# Patient Record
Sex: MALE | Race: BLACK OR AFRICAN AMERICAN | NOT HISPANIC OR LATINO | Employment: UNEMPLOYED | URBAN - METROPOLITAN AREA
[De-identification: names, ages, dates, MRNs, and addresses within clinical notes are randomized per-mention and may not be internally consistent; named-entity substitution may affect disease eponyms.]

---

## 2024-05-01 ENCOUNTER — HOSPITAL ENCOUNTER (EMERGENCY)
Facility: CLINIC | Age: 24
Discharge: HOME | End: 2024-05-01
Attending: PHYSICIAN ASSISTANT | Admitting: PHYSICIAN ASSISTANT
Payer: MEDICAID

## 2024-05-01 VITALS
HEART RATE: 72 BPM | TEMPERATURE: 98.1 F | DIASTOLIC BLOOD PRESSURE: 97 MMHG | WEIGHT: 170 LBS | SYSTOLIC BLOOD PRESSURE: 142 MMHG | OXYGEN SATURATION: 100 % | RESPIRATION RATE: 18 BRPM

## 2024-05-01 DIAGNOSIS — J10.1 INFLUENZA A: ICD-10-CM

## 2024-05-01 LAB
ALBUMIN SERPL BCG-MCNC: 4.4 G/DL (ref 3.5–5.2)
ALP SERPL-CCNC: 63 U/L (ref 40–150)
ALT SERPL W P-5'-P-CCNC: 21 U/L (ref 0–70)
ANION GAP SERPL CALCULATED.3IONS-SCNC: 8 MMOL/L (ref 7–15)
AST SERPL W P-5'-P-CCNC: 19 U/L (ref 0–45)
BASOPHILS # BLD AUTO: 0 10E3/UL (ref 0–0.2)
BASOPHILS NFR BLD AUTO: 0 %
BILIRUB SERPL-MCNC: 0.4 MG/DL
BUN SERPL-MCNC: 16 MG/DL (ref 6–20)
CALCIUM SERPL-MCNC: 9.1 MG/DL (ref 8.6–10)
CHLORIDE SERPL-SCNC: 104 MMOL/L (ref 98–107)
CREAT SERPL-MCNC: 0.98 MG/DL (ref 0.67–1.17)
DEPRECATED HCO3 PLAS-SCNC: 29 MMOL/L (ref 22–29)
EGFRCR SERPLBLD CKD-EPI 2021: >90 ML/MIN/1.73M2
EOSINOPHIL # BLD AUTO: 0 10E3/UL (ref 0–0.7)
EOSINOPHIL NFR BLD AUTO: 1 %
ERYTHROCYTE [DISTWIDTH] IN BLOOD BY AUTOMATED COUNT: 12.2 % (ref 10–15)
FLUAV RNA SPEC QL NAA+PROBE: POSITIVE
FLUBV RNA RESP QL NAA+PROBE: NEGATIVE
GLUCOSE SERPL-MCNC: 110 MG/DL (ref 70–99)
HCT VFR BLD AUTO: 49.2 % (ref 40–53)
HGB BLD-MCNC: 16.5 G/DL (ref 13.3–17.7)
IMM GRANULOCYTES # BLD: 0 10E3/UL
IMM GRANULOCYTES NFR BLD: 0 %
LIPASE SERPL-CCNC: 30 U/L (ref 13–60)
LYMPHOCYTES # BLD AUTO: 1.9 10E3/UL (ref 0.8–5.3)
LYMPHOCYTES NFR BLD AUTO: 55 %
MCH RBC QN AUTO: 29.3 PG (ref 26.5–33)
MCHC RBC AUTO-ENTMCNC: 33.5 G/DL (ref 31.5–36.5)
MCV RBC AUTO: 87 FL (ref 78–100)
MONOCYTES # BLD AUTO: 0.5 10E3/UL (ref 0–1.3)
MONOCYTES NFR BLD AUTO: 14 %
NEUTROPHILS # BLD AUTO: 1.1 10E3/UL (ref 1.6–8.3)
NEUTROPHILS NFR BLD AUTO: 30 %
NRBC # BLD AUTO: 0 10E3/UL
NRBC BLD AUTO-RTO: 0 /100
PLATELET # BLD AUTO: 148 10E3/UL (ref 150–450)
POTASSIUM SERPL-SCNC: 4.6 MMOL/L (ref 3.4–5.3)
PROT SERPL-MCNC: 7.7 G/DL (ref 6.4–8.3)
RBC # BLD AUTO: 5.64 10E6/UL (ref 4.4–5.9)
RSV RNA SPEC NAA+PROBE: NEGATIVE
SARS-COV-2 RNA RESP QL NAA+PROBE: NEGATIVE
SARS-COV-2 RNA RESP QL NAA+PROBE: NEGATIVE
SODIUM SERPL-SCNC: 141 MMOL/L (ref 135–145)
WBC # BLD AUTO: 3.5 10E3/UL (ref 4–11)

## 2024-05-01 PROCEDURE — 36415 COLL VENOUS BLD VENIPUNCTURE: CPT | Performed by: PHYSICIAN ASSISTANT

## 2024-05-01 PROCEDURE — 96361 HYDRATE IV INFUSION ADD-ON: CPT

## 2024-05-01 PROCEDURE — 250N000011 HC RX IP 250 OP 636: Performed by: PHYSICIAN ASSISTANT

## 2024-05-01 PROCEDURE — 258N000003 HC RX IP 258 OP 636: Performed by: PHYSICIAN ASSISTANT

## 2024-05-01 PROCEDURE — 96374 THER/PROPH/DIAG INJ IV PUSH: CPT

## 2024-05-01 PROCEDURE — 99284 EMERGENCY DEPT VISIT MOD MDM: CPT | Mod: 25

## 2024-05-01 PROCEDURE — 87635 SARS-COV-2 COVID-19 AMP PRB: CPT | Performed by: PHYSICIAN ASSISTANT

## 2024-05-01 PROCEDURE — 83690 ASSAY OF LIPASE: CPT | Performed by: PHYSICIAN ASSISTANT

## 2024-05-01 PROCEDURE — 82040 ASSAY OF SERUM ALBUMIN: CPT | Performed by: PHYSICIAN ASSISTANT

## 2024-05-01 PROCEDURE — 87635 SARS-COV-2 COVID-19 AMP PRB: CPT | Performed by: EMERGENCY MEDICINE

## 2024-05-01 PROCEDURE — 85025 COMPLETE CBC W/AUTO DIFF WBC: CPT | Performed by: PHYSICIAN ASSISTANT

## 2024-05-01 PROCEDURE — 96375 TX/PRO/DX INJ NEW DRUG ADDON: CPT

## 2024-05-01 PROCEDURE — 87637 SARSCOV2&INF A&B&RSV AMP PRB: CPT | Performed by: PHYSICIAN ASSISTANT

## 2024-05-01 RX ORDER — ONDANSETRON 2 MG/ML
4 INJECTION INTRAMUSCULAR; INTRAVENOUS ONCE
Status: COMPLETED | OUTPATIENT
Start: 2024-05-01 | End: 2024-05-01

## 2024-05-01 RX ORDER — KETOROLAC TROMETHAMINE 15 MG/ML
15 INJECTION, SOLUTION INTRAMUSCULAR; INTRAVENOUS ONCE
Status: COMPLETED | OUTPATIENT
Start: 2024-05-01 | End: 2024-05-01

## 2024-05-01 RX ORDER — ONDANSETRON 4 MG/1
4 TABLET, ORALLY DISINTEGRATING ORAL EVERY 8 HOURS PRN
Qty: 10 TABLET | Refills: 0 | Status: SHIPPED | OUTPATIENT
Start: 2024-05-01

## 2024-05-01 RX ADMIN — ONDANSETRON 4 MG: 2 INJECTION INTRAMUSCULAR; INTRAVENOUS at 18:43

## 2024-05-01 RX ADMIN — KETOROLAC TROMETHAMINE 15 MG: 15 INJECTION, SOLUTION INTRAMUSCULAR; INTRAVENOUS at 18:43

## 2024-05-01 RX ADMIN — SODIUM CHLORIDE 1000 ML: 9 INJECTION, SOLUTION INTRAVENOUS at 18:40

## 2024-05-01 ASSESSMENT — ACTIVITIES OF DAILY LIVING (ADL)
ADLS_ACUITY_SCORE: 33
ADLS_ACUITY_SCORE: 33
ADLS_ACUITY_SCORE: 35
ADLS_ACUITY_SCORE: 35

## 2024-05-01 NOTE — ED PROVIDER NOTES
History     Chief Complaint:  Nausea & Vomiting and Headache       HPI   David Mehta is a 24 year old male with no significant past medical history who presents with nausea, vomiting, headache, and neck pain. Symptoms started gradually five days ago. He initially felt nauseous and then had several episodes of vomiting. Notes he had headache and fever as well. No fever today. Denies sick contacts. Denies chest pain, shortness of breath, abdominal pain, diarrhea, hematuria, dysuria, and rash. Denies head injury or trauma. He takes no medication. Denies thunderclap or sudden headache, vision changes, neck manipulation. Denies alcohol use.       Independent Historian:   None - Patient Only    Review of External Notes:   None     Medications:    ondansetron (ZOFRAN ODT) 4 MG ODT tab        Past Medical History:    No past medical history on file.    Past Surgical History:    No past surgical history on file.     Physical Exam   Patient Vitals for the past 24 hrs:   BP Temp Temp src Pulse Resp SpO2 Weight   05/01/24 2013 (!) 142/97 -- -- -- -- 100 % --   05/01/24 1839 -- -- -- -- -- 98 % --   05/01/24 1443 135/81 98.1  F (36.7  C) Oral 72 18 100 % 77.1 kg (170 lb)        Physical Exam  Constitutional: Alert, attentive, GCS 15  HENT:    Nose: Nose normal.    Mouth/Throat: Oropharynx is clear, mucous membranes are moist   Eyes: EOM are normal.   Neck: Supple, no rigidity. Normal range of motion. Bilateral paraspinal cervical tenderness without spasm.   CV: regular rate and rhythm; no murmurs, rubs or gallups  Chest: Effort normal and breath sounds normal.   GI:  There is no tenderness. No distension. Normal bowel sounds  MSK: Normal range of motion.   Neurological: Alert, attentive, Oriented x 3. No facial asymmetry. CN II-XII intact. 5/5 strength in upper and lower extremities. Normal range of motion in all four extremities. Distal sensation in hands and feet intact. Normal gait.  Skin: Skin is warm and  dry.      Emergency Department Course     Imaging:  No orders to display      Laboratory:  Labs Ordered and Resulted from Time of ED Arrival to Time of ED Departure   COMPREHENSIVE METABOLIC PANEL - Abnormal       Result Value    Sodium 141      Potassium 4.6      Carbon Dioxide (CO2) 29      Anion Gap 8      Urea Nitrogen 16.0      Creatinine 0.98      GFR Estimate >90      Calcium 9.1      Chloride 104      Glucose 110 (*)     Alkaline Phosphatase 63      AST 19      ALT 21      Protein Total 7.7      Albumin 4.4      Bilirubin Total 0.4     INFLUENZA A/B, RSV, & SARS-COV2 PCR - Abnormal    Influenza A PCR Positive (*)     Influenza B PCR Negative      RSV PCR Negative      SARS CoV2 PCR Negative     CBC WITH PLATELETS AND DIFFERENTIAL - Abnormal    WBC Count 3.5 (*)     RBC Count 5.64      Hemoglobin 16.5      Hematocrit 49.2      MCV 87      MCH 29.3      MCHC 33.5      RDW 12.2      Platelet Count 148 (*)     % Neutrophils 30      % Lymphocytes 55      % Monocytes 14      % Eosinophils 1      % Basophils 0      % Immature Granulocytes 0      NRBCs per 100 WBC 0      Absolute Neutrophils 1.1 (*)     Absolute Lymphocytes 1.9      Absolute Monocytes 0.5      Absolute Eosinophils 0.0      Absolute Basophils 0.0      Absolute Immature Granulocytes 0.0      Absolute NRBCs 0.0     COVID-19 VIRUS (CORONAVIRUS) BY PCR - Normal    SARS CoV2 PCR Negative     LIPASE - Normal    Lipase 30          Emergency Department Course & Assessments:    Interventions:  Medications   sodium chloride 0.9% BOLUS 1,000 mL (0 mLs Intravenous Stopped 5/1/24 2008)   ketorolac (TORADOL) injection 15 mg (15 mg Intravenous $Given 5/1/24 1843)   ondansetron (ZOFRAN) injection 4 mg (4 mg Intravenous $Given 5/1/24 1843)        Assessments:  1825  I obtained patient history and performed physical examination as above.      Independent Interpretation (X-rays, CTs, rhythm strip):  None    Consultations/Discussion of Management or Tests:  None         Social Determinants of Health affecting care:   None    Disposition:  The patient was discharged.     Impression & Plan    CMS Diagnoses: None       Medical Decision Making:  Patient is a nontoxic-appearing 24-year-old male who presents with headache, neck pain, body aches, nausea, and vomiting for the past 5 days.  He is afebrile, normotensive, nonhypoxic.  Physical examination reveals no neurological deficits and a normal cardiopulmonary exam.  Differential includes viral URI, pancreatitis, CNS infection.  Labs today are reassuring.  Mild leukopenia present and a positive influenza A test.  Findings would explain patient's symptoms.  He has no red flags for pneumonia such as fevers, chest pain, shortness of breath and chest x-ray is not indicated today.  He also had resolution of his headache here in the department.  Low suspicion for subarachnoid hemorrhage or other intracranial process.  No advanced imaging is indicated.  He is out of the window for Tamiflu.  Recommend Tylenol and ibuprofen for symptoms and he will also be sent home with Zofran.  Return precautions to the ED discussed including return of fevers, chest pain, shortness of breath, and neck pain.  Patient is comfortable with plan and was discharged home.    Diagnosis:    ICD-10-CM    1. Influenza A  J10.1            Discharge Medications:  Discharge Medication List as of 5/1/2024  8:08 PM        START taking these medications    Details   ondansetron (ZOFRAN ODT) 4 MG ODT tab Take 1 tablet (4 mg) by mouth every 8 hours as needed for nausea, Disp-10 tablet, R-0, Local Print                5/1/2024   Tonya Antonio PA-C Steinbrueck, Emily, PA-C  05/01/24 8242